# Patient Record
Sex: MALE | Race: BLACK OR AFRICAN AMERICAN | URBAN - METROPOLITAN AREA
[De-identification: names, ages, dates, MRNs, and addresses within clinical notes are randomized per-mention and may not be internally consistent; named-entity substitution may affect disease eponyms.]

---

## 2019-03-18 PROBLEM — R32 URINE INCONTINENCE: Status: ACTIVE | Noted: 2019-03-18

## 2019-03-21 PROBLEM — N40.1 ENLARGED PROSTATE WITH LOWER URINARY TRACT SYMPTOMS (LUTS): Status: ACTIVE | Noted: 2019-03-21

## 2019-03-21 PROBLEM — N39.0 URINARY TRACT INFECTION WITHOUT HEMATURIA: Status: ACTIVE | Noted: 2019-03-21

## 2019-03-21 PROBLEM — N39.41 URGE INCONTINENCE: Status: ACTIVE | Noted: 2019-03-18

## 2019-03-21 PROBLEM — R35.1 NOCTURIA: Status: ACTIVE | Noted: 2019-03-21

## 2019-03-24 PROBLEM — Z87.440 HISTORY OF UTI: Status: ACTIVE | Noted: 2019-03-24

## 2019-03-24 PROBLEM — R97.20 ELEVATED PSA: Status: ACTIVE | Noted: 2019-03-24

## 2019-12-09 ENCOUNTER — DOCUMENTATION ONLY (OUTPATIENT)
Dept: VASCULAR SURGERY | Age: 69
End: 2019-12-09

## 2019-12-18 ENCOUNTER — OFFICE VISIT (OUTPATIENT)
Dept: VASCULAR SURGERY | Age: 69
End: 2019-12-18

## 2019-12-18 ENCOUNTER — HOSPITAL ENCOUNTER (EMERGENCY)
Age: 69
Discharge: STILL ADMITTED OR ADMINISTRATIVE | End: 2019-12-18
Attending: EMERGENCY MEDICINE

## 2019-12-18 VITALS
WEIGHT: 233 LBS | HEIGHT: 70 IN | BODY MASS INDEX: 33.36 KG/M2 | DIASTOLIC BLOOD PRESSURE: 80 MMHG | SYSTOLIC BLOOD PRESSURE: 140 MMHG | RESPIRATION RATE: 17 BRPM

## 2019-12-18 DIAGNOSIS — Z87.891 HISTORY OF TOBACCO ABUSE: ICD-10-CM

## 2019-12-18 DIAGNOSIS — G62.9 NEUROPATHY: ICD-10-CM

## 2019-12-18 DIAGNOSIS — C80.1 CANCER (HCC): ICD-10-CM

## 2019-12-18 DIAGNOSIS — R42 DIZZINESS: ICD-10-CM

## 2019-12-18 DIAGNOSIS — R26.81 UNSTEADY GAIT: Primary | ICD-10-CM

## 2019-12-18 NOTE — PROGRESS NOTES
Reese Lugo    Chief Complaint   Patient presents with    New Patient     Carotid       HPI    Reese Lugo is a 71 y.o. male who presents to the office today at the request of his primary care physician at the [facility] for possible carotid artery stenosis. Patient describes an unsteadiness on his feet and dizziness. He states that every now and then if he is looking down he feels like he may be leaning too far forward and feels as if he is going to fall over or have a very unsteady sideways gait. This does not happen often but has happened more than one occasion. 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 He does also get some numbness in his hands on occasion. He does not describe any symptoms of claudication. He has no rest pain. No fevers or chills. States that he was diagnosed with what sounded like multiple myeloma and has been on chemotherapy. Otherwise he states that he is feeling well and is without complaint. Patient states that he quit smoking many years ago. He has no personal history of diabetes. Past Medical History:   Diagnosis Date    GERD (gastroesophageal reflux disease)     HTN (hypertension)     Hypercholesteremia     Obesity     Urine incontinence      Patient Active Problem List   Diagnosis Code    Urge incontinence N39.41    Urinary tract infection without hematuria N39.0    Enlarged prostate with lower urinary tract symptoms (LUTS) N40.1    Nocturia R35.1    History of UTI Z87.440    Elevated PSA R97.20     Past Surgical History:   Procedure Laterality Date    HX CATARACT REMOVAL Bilateral     12/18left , 9/18 right    HX HIP FRACTURE TX Right 1985    3 screws placed in hip     Current Outpatient Medications   Medication Sig Dispense Refill    tamsulosin (FLOMAX) 0.4 mg capsule Take 1 Cap by mouth daily.  30 Cap 11    atorvastatin (LIPITOR) 10 mg tablet   2    omeprazole (PRILOSEC) 40 mg capsule       sucralfate (CARAFATE) 1 gram tablet       gabapentin (NEURONTIN) 300 mg capsule Take 300 mg by mouth three (3) times daily.  amLODIPine (NORVASC) 10 mg tablet Take  by mouth daily.  loratadine 10 mg cap Take  by mouth. Allergies   Allergen Reactions    Peanuts  [Peanut] Anaphylaxis     Social History     Socioeconomic History    Marital status:      Spouse name: Not on file    Number of children: Not on file    Years of education: Not on file    Highest education level: Not on file   Occupational History    Not on file   Social Needs    Financial resource strain: Not on file    Food insecurity:     Worry: Not on file     Inability: Not on file    Transportation needs:     Medical: Not on file     Non-medical: Not on file   Tobacco Use    Smoking status: Never Smoker    Smokeless tobacco: Never Used   Substance and Sexual Activity    Alcohol use: Yes     Alcohol/week: 2.0 standard drinks     Types: 1 Cans of beer, 1 Shots of liquor per week    Drug use: Not on file    Sexual activity: Not on file   Lifestyle    Physical activity:     Days per week: Not on file     Minutes per session: Not on file    Stress: Not on file   Relationships    Social connections:     Talks on phone: Not on file     Gets together: Not on file     Attends Zoroastrianism service: Not on file     Active member of club or organization: Not on file     Attends meetings of clubs or organizations: Not on file     Relationship status: Not on file    Intimate partner violence:     Fear of current or ex partner: Not on file     Emotionally abused: Not on file     Physically abused: Not on file     Forced sexual activity: Not on file   Other Topics Concern    Not on file   Social History Narrative    Not on file      History reviewed. No pertinent family history.     Review of Systems    Constitutional: negative   HEENT: negative   Respiratory: negative   Cardiovascular: negative Gastrointestinal: negative   Genitourinary:negative   Hematologic/lymphatic: negative   Musculoskeletal:negative   Neurological: Positive for neuropathy in both of his feet intermittent numbness and tingling in his hands, intermittent dizziness and unsteady gait  Behavioral/Psych: negative   Endocrine: negative   Allergic/Immunologic: negative      Physical Exam:    Visit Vitals  /80 (BP 1 Location: Left arm, BP Patient Position: Sitting)   Resp 17   Ht 5' 10\" (1.778 m)   Wt 233 lb (105.7 kg)   BMI 33.43 kg/m²      General: Male in no acute distress   HEENT: EOMI, no scleral icterus is noted. He does have some tearing in his left eye. No carotid bruit appreciated bilaterally  Cardiovascular: Regular rhythm normal S1-S2 no rubs murmurs or gallops. 2+ radial pulses bilaterally  Pulmonary: No increased work or breathing is noted. Clear to auscultation bilaterally. No wheeze, rales or rhonchi. Abdomen: Positive bowel sounds, soft, nontender, nondistended. No rebound or guarding is noted. No palpable pulsatile abdominal mass is felt. Extremities: Warm and well perfused bilaterally. No bilateral lower extremity edema  Neuro: Cranial nerves II through XII are grossly intact   Integument: No ulcerations are identified visibly      Impression and Plan:  Margarito Gonzalez is a 71 y.o. male with intermittent dizziness and unsteady gait. Discussed that we can obtain a carotid ultrasound to rule out any possible carotid stenosis which could be contributing to his symptoms although I discussed that this is not typical or classic symptoms of carotid stenosis. Otherwise will defer further workup to his PCP. Patient expresses understanding to this and agrees to the plan. We reviewed the plan with the patient and the patient understands. We also gave the patient appropriate instructions on their disease process and when to call back.   Greater than 50% of this visit was spent with face to face discussion. PLEASE NOTE:  This document has been produced using voice recognition software. Unrecognized errors in transcription may be present.

## 2019-12-18 NOTE — PROGRESS NOTES
1. Have you been to an emergency room or urgent care clinic since your last visit? [address]    Hospitalized since your last visit? If yes, where, when, and reason for visit? No  2. Have you seen or consulted any other health care providers outside of the [facility] since your last visit including any procedures, health maintenance items. If yes, where, when and reason for visit?   No

## 2020-01-16 ENCOUNTER — OFFICE VISIT (OUTPATIENT)
Dept: VASCULAR SURGERY | Age: 70
End: 2020-01-16

## 2020-01-16 VITALS
SYSTOLIC BLOOD PRESSURE: 136 MMHG | BODY MASS INDEX: 33.36 KG/M2 | WEIGHT: 233 LBS | HEART RATE: 65 BPM | HEIGHT: 70 IN | OXYGEN SATURATION: 98 % | DIASTOLIC BLOOD PRESSURE: 70 MMHG | RESPIRATION RATE: 16 BRPM

## 2020-01-16 DIAGNOSIS — C80.1 CANCER (HCC): ICD-10-CM

## 2020-01-16 DIAGNOSIS — G62.9 NEUROPATHY: ICD-10-CM

## 2020-01-16 DIAGNOSIS — R26.81 UNSTEADY GAIT: ICD-10-CM

## 2020-01-16 DIAGNOSIS — R42 DIZZINESS: Primary | ICD-10-CM

## 2020-01-16 NOTE — PROGRESS NOTES
Andrew Martin    Chief Complaint   Patient presents with    New Patient    Carotid Artery Stenosis       HPI    Andrew aMrtin is a 71 y.o. male who presents to the office today in follow up after carotid artery ultrasound. Patient has been experiencing an unsteadiness on his feet and dizziness. He states that every now and then if he is looking down he feels like he may be leaning too far forward and feels as if he is going to fall over or have a very unsteady sideways gait. This does not happen often but has happened more than one occasion. 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 He does also get some numbness in his hands on occasion. He does not describe any symptoms of claudication. He has no rest pain. No fevers or chills. He was diagnosed with what sounds like multiple myeloma and has been on chemotherapy. Otherwise he states that he is feeling well and is without complaint. He quit smoking many years ago. He has no personal history of diabetes. Carotid study shows no evidence of hemodynamically significant arterial disease is identified within bilateral internal carotid, vertebral, and subclavian arteries.        Past Medical History:   Diagnosis Date    GERD (gastroesophageal reflux disease)     HTN (hypertension)     Hypercholesteremia     Obesity     Urine incontinence      Patient Active Problem List   Diagnosis Code    Urge incontinence N39.41    Urinary tract infection without hematuria N39.0    Enlarged prostate with lower urinary tract symptoms (LUTS) N40.1    Nocturia R35.1    History of UTI Z87.440    Elevated PSA R97.20     Past Surgical History:   Procedure Laterality Date    HX CATARACT REMOVAL Bilateral     12/18left , 9/18 right    HX HIP FRACTURE TX Right 1985    3 screws placed in hip     Current Outpatient Medications   Medication Sig Dispense Refill    tamsulosin (FLOMAX) 0.4 mg capsule Take 1 Cap by mouth daily. 30 Cap 11    atorvastatin (LIPITOR) 10 mg tablet   2    omeprazole (PRILOSEC) 40 mg capsule       sucralfate (CARAFATE) 1 gram tablet       gabapentin (NEURONTIN) 300 mg capsule Take 300 mg by mouth three (3) times daily.  amLODIPine (NORVASC) 10 mg tablet Take  by mouth daily.  loratadine 10 mg cap Take  by mouth. Allergies   Allergen Reactions    Peanuts  [Peanut] Anaphylaxis     Social History     Socioeconomic History    Marital status:      Spouse name: Not on file    Number of children: Not on file    Years of education: Not on file    Highest education level: Not on file   Occupational History    Not on file   Social Needs    Financial resource strain: Not on file    Food insecurity:     Worry: Not on file     Inability: Not on file    Transportation needs:     Medical: Not on file     Non-medical: Not on file   Tobacco Use    Smoking status: Never Smoker    Smokeless tobacco: Never Used   Substance and Sexual Activity    Alcohol use: Yes     Alcohol/week: 2.0 standard drinks     Types: 1 Cans of beer, 1 Shots of liquor per week    Drug use: Not on file    Sexual activity: Not on file   Lifestyle    Physical activity:     Days per week: Not on file     Minutes per session: Not on file    Stress: Not on file   Relationships    Social connections:     Talks on phone: Not on file     Gets together: Not on file     Attends Sabianist service: Not on file     Active member of club or organization: Not on file     Attends meetings of clubs or organizations: Not on file     Relationship status: Not on file    Intimate partner violence:     Fear of current or ex partner: Not on file     Emotionally abused: Not on file     Physically abused: Not on file     Forced sexual activity: Not on file   Other Topics Concern    Not on file   Social History Narrative    Not on file      History reviewed. No pertinent family history.       Physical Exam:    Visit Vitals  /70 (BP 1 Location: Left arm, BP Patient Position: Sitting)   Pulse 65   Resp 16   Ht 5' 10\" (1.778 m)   Wt 233 lb (105.7 kg)   SpO2 98%   BMI 33.43 kg/m²      General: Male in no acute distress   HEENT: EOMI, no scleral icterus is noted. He does have some tearing in his left eye. Pulmonary: No increased work or breathing is noted. Abdomen:  soft, nondistended. Extremities: Warm and well perfused bilaterally. No bilateral lower extremity edema  Neuro: Cranial nerves II through XII are grossly intact   Integument: No ulcerations are identified visibly      Impression and Plan:  Matthew Breaux is a 71 y.o. male with intermittent dizziness and unsteady gait. Imaging was reviewed with him in the office today as above. No evidence of hemodynamically significant arterial disease is identified within bilateral internal carotid, vertebral, and subclavian arteries. I did recommend that he follow-up with his primary care physician for ongoing work-up. No indication for vascular intervention at this current time or need for ongoing routine surveillance. He can follow-up in our office as needed. Patient expresses understanding to this and agrees to the plan. We reviewed the plan with the patient and the patient understands. We also gave the patient appropriate instructions on their disease process and when to call back. Greater than 50% of this visit was spent with face to face discussion. PLEASE NOTE:  This document has been produced using voice recognition software. Unrecognized errors in transcription may be present.
